# Patient Record
Sex: MALE | Race: WHITE | NOT HISPANIC OR LATINO | Employment: OTHER | URBAN - METROPOLITAN AREA
[De-identification: names, ages, dates, MRNs, and addresses within clinical notes are randomized per-mention and may not be internally consistent; named-entity substitution may affect disease eponyms.]

---

## 2018-05-23 ENCOUNTER — TELEPHONE (OUTPATIENT)
Dept: GASTROENTEROLOGY | Facility: CLINIC | Age: 68
End: 2018-05-23

## 2018-05-23 NOTE — TELEPHONE ENCOUNTER
----- Message from Gisella Arevalo MD sent at 5/23/2018  4:24 PM CDT -----  Regarding: RE: EUS referral  Pancreas cyst clinic    ----- Message -----  From: Julieth Plata MA  Sent: 5/23/2018   1:46 PM  To: Gisella Arevalo MD  Subject: FW: EUS referral                                     ----- Message -----  From: Marielena Nelson  Sent: 5/23/2018   1:39 PM  To: Dionna DYER Staff Adan leandro  Subject: EUS referral                                     Good afternoon,    Patient is being referred for an EUS. Referral and records have been scanned into  for review. Please let me know if you need any additional information.    Thanks!  Marielena

## 2018-08-16 ENCOUNTER — TELEPHONE (OUTPATIENT)
Dept: GASTROENTEROLOGY | Facility: CLINIC | Age: 68
End: 2018-08-16

## 2018-08-16 NOTE — TELEPHONE ENCOUNTER
----- Message from Martinez Renteria sent at 8/16/2018  3:45 PM CDT -----  Contact: Pt  Pt missed a call and would like the nurse to return their call.    Pt can be reached at [phone] after 4pm.    Thanks  
No answer.  
toileting/walking

## 2018-08-16 NOTE — TELEPHONE ENCOUNTER
Attempted to contact patient to schedule appointment. Spoke with Michelle from Hortencia Ferguson's office and informed her that we have been unable to reach patient. She stated that she too has had problems reaching patient. She will attempt to call him and give him our office number.

## 2018-08-21 ENCOUNTER — TELEPHONE (OUTPATIENT)
Dept: GASTROENTEROLOGY | Facility: CLINIC | Age: 68
End: 2018-08-21

## 2018-08-21 NOTE — TELEPHONE ENCOUNTER
----- Message from Shelley Baldwin sent at 8/21/2018  1:07 PM CDT -----  Contact: self - [phone]  deidra- calling for appt for pancreas issues - please call patient at

## 2018-08-21 NOTE — TELEPHONE ENCOUNTER
----- Message from Shelley Baldwin sent at 8/21/2018  1:07 PM CDT -----  Contact: self - [phone]  deidra- calling for appt for pancreas issues - please call patient at

## 2018-08-30 ENCOUNTER — OFFICE VISIT (OUTPATIENT)
Dept: GASTROENTEROLOGY | Facility: CLINIC | Age: 68
End: 2018-08-30
Payer: MEDICARE

## 2018-08-30 VITALS — WEIGHT: 222.69 LBS

## 2018-08-30 DIAGNOSIS — K86.2 PANCREAS CYST: ICD-10-CM

## 2018-08-30 DIAGNOSIS — K86.2 PANCREATIC CYST: ICD-10-CM

## 2018-08-30 PROCEDURE — 99213 OFFICE O/P EST LOW 20 MIN: CPT | Mod: PBBFAC,PO

## 2018-08-30 PROCEDURE — 99999 PR PBB SHADOW E&M-EST. PATIENT-LVL III: CPT | Mod: PBBFAC,,,

## 2018-08-30 PROCEDURE — 99204 OFFICE O/P NEW MOD 45 MIN: CPT | Mod: S$PBB,,, | Performed by: INTERNAL MEDICINE

## 2018-08-30 RX ORDER — ASPIRIN 325 MG
325 TABLET ORAL
Status: ON HOLD | COMMUNITY
Start: 2014-01-09 | End: 2023-07-03 | Stop reason: HOSPADM

## 2018-08-30 RX ORDER — AMLODIPINE BESYLATE 10 MG/1
10 TABLET ORAL
COMMUNITY
Start: 2018-04-09

## 2018-08-30 RX ORDER — ESOMEPRAZOLE MAGNESIUM 40 MG/1
40 CAPSULE, DELAYED RELEASE ORAL
COMMUNITY
Start: 2017-03-23

## 2018-08-30 RX ORDER — ATORVASTATIN CALCIUM 40 MG/1
40 TABLET, FILM COATED ORAL
COMMUNITY
Start: 2018-04-09 | End: 2023-07-03

## 2018-08-30 RX ORDER — TAMSULOSIN HYDROCHLORIDE 0.4 MG/1
0.4 CAPSULE ORAL
COMMUNITY
Start: 2018-08-24 | End: 2023-07-03

## 2018-08-30 RX ORDER — MONTELUKAST SODIUM 10 MG/1
TABLET ORAL
COMMUNITY
Start: 2018-07-23

## 2018-08-30 RX ORDER — FINASTERIDE 5 MG/1
TABLET, FILM COATED ORAL
COMMUNITY
Start: 2018-07-10

## 2018-08-30 RX ORDER — LISINOPRIL 40 MG/1
TABLET ORAL
COMMUNITY
Start: 2018-07-10

## 2019-03-22 NOTE — PROGRESS NOTES
Dr Dutta [place] reports he took care of this  Gastroenterology: [facility]      SUBJECTIVE:         Chief Complaint: Here for evaluation of a pancreatic cyst     History of Present Illness:  Patient is a 67 y.o. male presents with a pancreatic cyst. The cyst was first noted in April of 2018. It's located in the uncinate.  It measures 10 mm  in size.  It is not symptomatic. Previous studies include CT scan.     Previous FNA of the cyst has not been done    There is not a family history of pancreatic cancer     The patient does not smoke.    ECOG status 0 - Asymptomatic      (Not in a hospital admission)    Review of patient's allergies indicates:  No Known Allergies     No past medical history on file.  No past surgical history on file.  No family history on file.  Social History     Tobacco Use    Smoking status: Never Smoker    Smokeless tobacco: Never Used   Substance Use Topics    Alcohol use: No     Frequency: Never    Drug use: Not on file       Review of Systems:  A complete review of systems was performed and other than described in the HPI and below is negative       OBJECTIVE:     Vital Signs (Most Recent)           Diagnostic Results:  Labs: Reviewed  CT: Reviewed     ASSESSMENT/PLAN:     Pancreatic cyst in the uncinate. Measuring 10 mm in size,   Most likely etiology at this point is a undetermined   Year 0  We discussed in detail the natural history of pancreatic cysts, the therapy involved, and the benefits of multimodality imaging including Ct, MRI, and EUS with FNA    Plan:   We will be discussing his case at the pancreactic cyst clinic multidisciplinary conference to finalize a treatment plan.Current ACR guidelines would suggest  MRI in one year    We spent 25 minute in today's clinic with greater than 50% of the time dedicated to counseling and arranging care.

## 2019-12-20 ENCOUNTER — TELEPHONE (OUTPATIENT)
Dept: GASTROENTEROLOGY | Facility: CLINIC | Age: 69
End: 2019-12-20

## 2019-12-20 DIAGNOSIS — K86.2 PANCREATIC CYST: ICD-10-CM

## 2019-12-20 NOTE — TELEPHONE ENCOUNTER
Patient last seen August 2018.   Per clinic note, case was to be discussed at pancreatic multidisciplinary conference. I am not able to see the recommendations for further testing.  Patient is on vacation until January 3 if any testing needs to be done.

## 2019-12-20 NOTE — TELEPHONE ENCOUNTER
----- Message from Laurie Zhong sent at 12/20/2019  8:10 AM CST -----  Contact: [phone]/self  Patient is requesting to speak with you regarding scheduling an appointment. Please advise.

## 2019-12-24 RX ORDER — HYDROCODONE BITARTRATE AND ACETAMINOPHEN 10; 325 MG/1; MG/1
TABLET ORAL
COMMUNITY
Start: 2019-12-17

## 2019-12-24 RX ORDER — CETIRIZINE HYDROCHLORIDE 10 MG/1
10 TABLET ORAL DAILY
COMMUNITY
Start: 2019-10-03

## 2019-12-24 RX ORDER — DICLOFENAC SODIUM 75 MG/1
TABLET, DELAYED RELEASE ORAL
COMMUNITY
Start: 2019-11-26

## 2019-12-24 NOTE — TELEPHONE ENCOUNTER
MRI rescheduled to 01/02/2020 at 1130am.  Confirmed with patient.  Appt reminder mailed to address on file.

## 2020-01-02 ENCOUNTER — HOSPITAL ENCOUNTER (OUTPATIENT)
Dept: RADIOLOGY | Facility: HOSPITAL | Age: 70
Discharge: HOME | End: 2020-01-02
Attending: INTERNAL MEDICINE
Payer: MEDICARE

## 2020-01-02 DIAGNOSIS — K86.2 PANCREATIC CYST: ICD-10-CM

## 2020-01-02 LAB
CREAT SERPL-MCNC: 0.7 MG/DL (ref 0.5–1.4)
SAMPLE: NORMAL

## 2020-01-02 PROCEDURE — 74183 MRI ABD W/O CNTR FLWD CNTR: CPT | Mod: 26,,, | Performed by: RADIOLOGY

## 2020-01-02 PROCEDURE — A9585 GADOBUTROL INJECTION: HCPCS | Performed by: INTERNAL MEDICINE

## 2020-01-02 PROCEDURE — 25500020 PHARM REV CODE 255: Performed by: INTERNAL MEDICINE

## 2020-01-02 PROCEDURE — 74183 MRI ABD W/O CNTR FLWD CNTR: CPT | Mod: TC

## 2020-01-02 PROCEDURE — 74183 MRI ABDOMEN W WO CONTRAST: ICD-10-PCS | Mod: 26,,, | Performed by: RADIOLOGY

## 2020-01-02 RX ORDER — GADOBUTROL 604.72 MG/ML
10 INJECTION INTRAVENOUS
Status: COMPLETED | OUTPATIENT
Start: 2020-01-02 | End: 2020-01-02

## 2020-01-02 RX ADMIN — GADOBUTROL 10 ML: 604.72 INJECTION INTRAVENOUS at 12:01

## 2020-01-03 ENCOUNTER — TELEPHONE (OUTPATIENT)
Dept: ENDOSCOPY | Facility: HOSPITAL | Age: 70
End: 2020-01-03

## 2020-01-03 NOTE — TELEPHONE ENCOUNTER
----- Message from Sanjay Grace MD sent at 1/3/2020  8:41 AM CST -----  Can you let him knw the cyst hasnot change in size.  We should repeat EUS in a year

## 2020-08-30 ENCOUNTER — TELEPHONE (OUTPATIENT)
Dept: ENDOSCOPY | Facility: HOSPITAL | Age: 70
End: 2020-08-30

## 2020-08-30 DIAGNOSIS — K86.2 PANCREATIC CYST: Primary | ICD-10-CM

## 2021-02-08 ENCOUNTER — TELEPHONE (OUTPATIENT)
Dept: GASTROENTEROLOGY | Facility: CLINIC | Age: 71
End: 2021-02-08

## 2021-02-08 DIAGNOSIS — Z01.818 PRE-PROCEDURAL EXAMINATION: ICD-10-CM

## 2021-02-08 RX ORDER — CYCLOBENZAPRINE HCL 5 MG
TABLET ORAL
COMMUNITY
Start: 2021-02-03

## 2021-02-08 RX ORDER — MELOXICAM 7.5 MG/1
TABLET ORAL
COMMUNITY
Start: 2021-02-03

## 2021-02-12 ENCOUNTER — TELEPHONE (OUTPATIENT)
Dept: GASTROENTEROLOGY | Facility: CLINIC | Age: 71
End: 2021-02-12

## 2021-03-11 ENCOUNTER — TELEPHONE (OUTPATIENT)
Dept: GASTROENTEROLOGY | Facility: CLINIC | Age: 71
End: 2021-03-11

## 2021-03-15 ENCOUNTER — TELEPHONE (OUTPATIENT)
Dept: ENDOSCOPY | Facility: HOSPITAL | Age: 71
End: 2021-03-15

## 2021-03-17 ENCOUNTER — ANESTHESIA (OUTPATIENT)
Dept: ENDOSCOPY | Facility: HOSPITAL | Age: 71
End: 2021-03-17
Payer: MEDICARE

## 2021-03-17 ENCOUNTER — HOSPITAL ENCOUNTER (OUTPATIENT)
Facility: HOSPITAL | Age: 71
Discharge: HOME | End: 2021-03-17
Attending: INTERNAL MEDICINE | Admitting: INTERNAL MEDICINE
Payer: MEDICARE

## 2021-03-17 ENCOUNTER — ANESTHESIA EVENT (OUTPATIENT)
Dept: ENDOSCOPY | Facility: HOSPITAL | Age: 71
End: 2021-03-17
Payer: MEDICARE

## 2021-03-17 VITALS
TEMPERATURE: 98 F | RESPIRATION RATE: 16 BRPM | SYSTOLIC BLOOD PRESSURE: 132 MMHG | HEIGHT: 66 IN | WEIGHT: 200 LBS | BODY MASS INDEX: 32.14 KG/M2 | HEART RATE: 66 BPM | DIASTOLIC BLOOD PRESSURE: 65 MMHG | OXYGEN SATURATION: 97 %

## 2021-03-17 DIAGNOSIS — K86.2 PANCREAS CYST: ICD-10-CM

## 2021-03-17 LAB — SARS-COV-2 RDRP RESP QL NAA+PROBE: NEGATIVE

## 2021-03-17 PROCEDURE — 63600175 PHARM REV CODE 636 W HCPCS: Performed by: NURSE ANESTHETIST, CERTIFIED REGISTERED

## 2021-03-17 PROCEDURE — 43259 PR ENDOSCOPIC ULTRASOUND EXAM: ICD-10-PCS | Mod: ,,, | Performed by: INTERNAL MEDICINE

## 2021-03-17 PROCEDURE — U0002 COVID-19 LAB TEST NON-CDC: HCPCS | Performed by: INTERNAL MEDICINE

## 2021-03-17 PROCEDURE — 25000003 PHARM REV CODE 250: Performed by: NURSE ANESTHETIST, CERTIFIED REGISTERED

## 2021-03-17 PROCEDURE — 43259 EGD US EXAM DUODENUM/JEJUNUM: CPT | Performed by: INTERNAL MEDICINE

## 2021-03-17 PROCEDURE — 37000009 HC ANESTHESIA EA ADD 15 MINS: Performed by: INTERNAL MEDICINE

## 2021-03-17 PROCEDURE — 37000008 HC ANESTHESIA 1ST 15 MINUTES: Performed by: INTERNAL MEDICINE

## 2021-03-17 PROCEDURE — 43259 EGD US EXAM DUODENUM/JEJUNUM: CPT | Mod: ,,, | Performed by: INTERNAL MEDICINE

## 2021-03-17 PROCEDURE — 25000003 PHARM REV CODE 250: Performed by: INTERNAL MEDICINE

## 2021-03-17 RX ORDER — SODIUM CHLORIDE 0.9 % (FLUSH) 0.9 %
10 SYRINGE (ML) INJECTION
Status: DISCONTINUED | OUTPATIENT
Start: 2021-03-17 | End: 2021-03-17 | Stop reason: HOSPADM

## 2021-03-17 RX ORDER — SODIUM CHLORIDE 9 MG/ML
INJECTION, SOLUTION INTRAVENOUS CONTINUOUS
Status: DISCONTINUED | OUTPATIENT
Start: 2021-03-17 | End: 2021-03-17 | Stop reason: HOSPADM

## 2021-03-17 RX ORDER — ACETAMINOPHEN 500 MG
1000 TABLET ORAL EVERY 6 HOURS PRN
COMMUNITY

## 2021-03-17 RX ORDER — PROPOFOL 10 MG/ML
VIAL (ML) INTRAVENOUS
Status: DISCONTINUED | OUTPATIENT
Start: 2021-03-17 | End: 2021-03-17

## 2021-03-17 RX ORDER — PROPOFOL 10 MG/ML
VIAL (ML) INTRAVENOUS CONTINUOUS PRN
Status: DISCONTINUED | OUTPATIENT
Start: 2021-03-17 | End: 2021-03-17

## 2021-03-17 RX ORDER — LIDOCAINE HCL/PF 100 MG/5ML
SYRINGE (ML) INTRAVENOUS
Status: DISCONTINUED | OUTPATIENT
Start: 2021-03-17 | End: 2021-03-17

## 2021-03-17 RX ADMIN — PROPOFOL 150 MCG/KG/MIN: 10 INJECTION, EMULSION INTRAVENOUS at 12:03

## 2021-03-17 RX ADMIN — SODIUM CHLORIDE: 0.9 INJECTION, SOLUTION INTRAVENOUS at 11:03

## 2021-03-17 RX ADMIN — PROPOFOL 50 MG: 10 INJECTION, EMULSION INTRAVENOUS at 12:03

## 2021-03-17 RX ADMIN — LIDOCAINE HYDROCHLORIDE 50 MG: 20 INJECTION, SOLUTION INTRAVENOUS at 12:03

## 2021-03-22 ENCOUNTER — TELEPHONE (OUTPATIENT)
Dept: GASTROENTEROLOGY | Facility: CLINIC | Age: 71
End: 2021-03-22

## 2022-01-03 ENCOUNTER — TELEPHONE (OUTPATIENT)
Dept: ENDOSCOPY | Facility: HOSPITAL | Age: 72
End: 2022-01-03
Payer: MEDICARE

## 2022-01-03 DIAGNOSIS — R93.89 ABNORMAL FINDING ON IMAGING: Primary | ICD-10-CM

## 2022-01-03 NOTE — TELEPHONE ENCOUNTER
----- Message from Julieth Plata MA sent at 10/19/2021  8:43 PM CDT -----         - Perform magnetic resonance imaging (MRI) with                         gadolinium in 1 year.   Sanjay Grace MD   3/17/2021 12:37:39 PM

## 2022-03-18 ENCOUNTER — HOSPITAL ENCOUNTER (OUTPATIENT)
Dept: RADIOLOGY | Facility: HOSPITAL | Age: 72
Discharge: HOME | End: 2022-03-18
Attending: INTERNAL MEDICINE
Payer: MEDICARE

## 2022-03-18 DIAGNOSIS — R93.89 ABNORMAL FINDING ON IMAGING: ICD-10-CM

## 2022-03-18 PROCEDURE — 76376 3D RENDER W/INTRP POSTPROCES: CPT | Mod: 26,,, | Performed by: RADIOLOGY

## 2022-03-18 PROCEDURE — 76376 MRI ABDOMEN WITHOUT CONTRAST MRCP: ICD-10-PCS | Mod: 26,,, | Performed by: RADIOLOGY

## 2022-03-18 PROCEDURE — 74181 MRI ABDOMEN W/O CONTRAST: CPT | Mod: TC

## 2022-03-18 PROCEDURE — 74181 MRI ABDOMEN W/O CONTRAST: CPT | Mod: 26,,, | Performed by: RADIOLOGY

## 2022-03-18 PROCEDURE — 76376 3D RENDER W/INTRP POSTPROCES: CPT | Mod: TC

## 2022-03-18 PROCEDURE — 74181 MRI ABDOMEN WITHOUT CONTRAST MRCP: ICD-10-PCS | Mod: 26,,, | Performed by: RADIOLOGY

## 2022-03-23 ENCOUNTER — TELEPHONE (OUTPATIENT)
Dept: ENDOSCOPY | Facility: HOSPITAL | Age: 72
End: 2022-03-23
Payer: MEDICARE

## 2022-03-23 NOTE — TELEPHONE ENCOUNTER
Denise,    Please see below from Dr. Grace.            MD Little Ruiz, RN  Can you let him the cyst hasn't changed in a year.  We should do an EUS in one year.     After 5 years of stability we'll stretch the surveillance out to every 2 years.                 MRI MRCP Without Contrast  Order: [id number]   Status: Final result     Visible to patient: No (inaccessible in Patient Portal)     Dx: Abnormal finding on imaging     1 Result Note    Details    Reading Physician Reading Date Result Priority   Aguilar Cano MD  [phone]  [phone] 3/18/2022 Routine     Narrative & Impression  EXAMINATION:  MRI ABDOMEN WITHOUT CONTRAST MRCP     CLINICAL HISTORY:  pancreas cyst;  Abnormal findings on diagnostic imaging of other specified body structures     TECHNIQUE:  Multiplanar, multisequence images are performed through the liver and upper abdomen.  Additionally 2D and 3D MRCP sequences are performed as well as MIP images.     COMPARISON:  01/02/2020     FINDINGS:  There is a 1.1 cm cystic lesion along the uncinate process of the pancreas.  There is no discrete solid nodule or septation.  There is no clear involvement with the adjacent pancreas duct which is normal in caliber.  Unremarkable gallbladder.  No biliary dilation.  No filling defect in the common bile duct which is normal in caliber.  There are multiple T2 hyperintense lesions in each renal cortex incompletely characterized without contrast.  There is a nodule along the medial limb left adrenal gland measuring 1.1 cm with signal dropout on out of phase sequence.  Remaining solid abdominal organs are unremarkable.  Proximal gastric wall thickening is presumably from inadequate distension.  No dilated bowel loops.  No ascites or abdominal adenopathy.     Impression:     Minimal if any change in size of the pancreas cystic lesion.  Additional cystic lesions along the kidneys are unchanged.  Left adrenal adenoma.        Electronically signed  by: Aguilar Cano  Date:                                            03/18/2022  Time:                                           16:10             Exam Ended: 03/18/22 12:07 Last Resulted: 03/18/22 16:10        Order Details      View Encounter      Lab and Collection Details      Routing      Result History             Result Care Coordination        Result Notes     Sanjay Grace MD   3/22/2022 10:10 AM CDT Back to Top        Can you let him the cyst hasn't changed in a year.  We should do an EUS in one year.     After 5 years of stability we'll stretch the surveillance out to every 2 years.

## 2022-03-25 ENCOUNTER — TELEPHONE (OUTPATIENT)
Dept: ENDOSCOPY | Facility: HOSPITAL | Age: 72
End: 2022-03-25
Payer: MEDICARE

## 2022-03-25 NOTE — TELEPHONE ENCOUNTER
Sanjay Grace MD   3/22/2022 10:10 AM CDT         Can you let him the cyst hasn't changed in a year.  We should do an EUS in one year.     After 5 years of stability we'll stretch the surveillance out to every 2 years.     Patient informed.

## 2022-03-25 NOTE — TELEPHONE ENCOUNTER
----- Message from Maya Enamorado sent at 3/25/2022  1:00 PM CDT -----  Contact: Pt mobile [phone]  Calling to get test results.  Name of test (lab, x-ray): MRI  Date of test: 03/18/2022  Where was the test performed: @[facility] [place] location  Would you like a call back, or a response through your [facility] portal?: Both

## 2022-03-25 NOTE — TELEPHONE ENCOUNTER
----- Message from Andrade Rodriguez sent at 3/25/2022  2:30 PM CDT -----  Contact: [phone]  Pt returning missed call from Julieth. Pt would like a call back.    [phone]

## 2023-01-03 DIAGNOSIS — K86.2 PANCREATIC CYST: Primary | ICD-10-CM

## 2023-03-19 ENCOUNTER — TELEPHONE (OUTPATIENT)
Dept: ENDOSCOPY | Facility: HOSPITAL | Age: 73
End: 2023-03-19

## 2023-04-26 ENCOUNTER — TELEPHONE (OUTPATIENT)
Dept: ENDOSCOPY | Facility: HOSPITAL | Age: 73
End: 2023-04-26
Payer: MEDICARE

## 2023-04-26 NOTE — TELEPHONE ENCOUNTER
Telephoned pt and pt's wife, Kelley, to schedule EUS, both with no answer.  Left voicemail messages with direct contact number for pt to return call.

## 2023-06-16 ENCOUNTER — TELEPHONE (OUTPATIENT)
Dept: ENDOSCOPY | Facility: HOSPITAL | Age: 73
End: 2023-06-16
Payer: MEDICARE

## 2023-06-16 NOTE — TELEPHONE ENCOUNTER
Pt telephoned to schedule EUS.  Spoke with pt and procedure scheduled for 7/3/23 at 10:00am at [facility].  Reviewed history and medications.  Instructed on procedure and preparation.  Pt verbalized understanding.  Instructions mailed to address on file.

## 2023-06-29 ENCOUNTER — TELEPHONE (OUTPATIENT)
Dept: ENDOSCOPY | Facility: HOSPITAL | Age: 73
End: 2023-06-29
Payer: MEDICARE

## 2023-06-29 NOTE — TELEPHONE ENCOUNTER
Spoke with patient about arrival time @ 0900.   [facility]    Day Before Procedure 7/2/23      You may have a light evening meal.   No solid food after 7:00 pm.   Continue drinking clear liquids.      Day of the Procedure 7/3/23      You may have water/clear liquids until 4 hours before your procedure or as directed by the scheduling nurse 6:00 AM.   See below for list.     What You CANNOT do:   Do not drink milk or anything colored red.  Do not drink alcohol.  No gum chewing or candy morning of procedure     Liquids That Are OK to Drink:   Water  Sports drinks (Gatorade, Power-Aid)  Coffee or tea (no cream or nondairy creamer)  Clear juices without pulp (apple, white grape)  Gelatin desserts (no fruit or toppings)  Clear soda (sprite, coke, ginger ale)  Chicken broth (until 12 midnight the night before procedure)      Medications: Do not take Insulin or oral diabetic medications the day of the procedure.    Take as prescribed: heart, seizure and blood pressure medication in the morning with a sip of water (less than an ounce).  Take any breathing medications and bring inhalers to hospital with you.     Leave all valuables and jewelry at home. Wear comfortable clothes to procedure to change into hospital gown.   You cannot drive for 24 hours after your procedure because you will receive sedation for your procedure to make you comfortable.    A ride must be provided at discharge.

## 2023-07-03 ENCOUNTER — HOSPITAL ENCOUNTER (OUTPATIENT)
Facility: HOSPITAL | Age: 73
Discharge: HOME | End: 2023-07-03
Attending: INTERNAL MEDICINE | Admitting: INTERNAL MEDICINE
Payer: MEDICARE

## 2023-07-03 ENCOUNTER — ANESTHESIA (OUTPATIENT)
Dept: ENDOSCOPY | Facility: HOSPITAL | Age: 73
End: 2023-07-03
Payer: MEDICARE

## 2023-07-03 ENCOUNTER — ANESTHESIA EVENT (OUTPATIENT)
Dept: ENDOSCOPY | Facility: HOSPITAL | Age: 73
End: 2023-07-03
Payer: MEDICARE

## 2023-07-03 VITALS
OXYGEN SATURATION: 98 % | BODY MASS INDEX: 35.36 KG/M2 | HEART RATE: 62 BPM | TEMPERATURE: 98 F | WEIGHT: 220 LBS | RESPIRATION RATE: 20 BRPM | HEIGHT: 66 IN | SYSTOLIC BLOOD PRESSURE: 170 MMHG | DIASTOLIC BLOOD PRESSURE: 77 MMHG

## 2023-07-03 DIAGNOSIS — K86.2 PANCREATIC CYST: ICD-10-CM

## 2023-07-03 PROCEDURE — 43259 EGD US EXAM DUODENUM/JEJUNUM: CPT | Performed by: INTERNAL MEDICINE

## 2023-07-03 PROCEDURE — 43259 EGD US EXAM DUODENUM/JEJUNUM: CPT | Mod: ,,, | Performed by: INTERNAL MEDICINE

## 2023-07-03 PROCEDURE — 25000003 PHARM REV CODE 250: Performed by: INTERNAL MEDICINE

## 2023-07-03 PROCEDURE — 25000003 PHARM REV CODE 250: Performed by: NURSE ANESTHETIST, CERTIFIED REGISTERED

## 2023-07-03 PROCEDURE — D9220A PRA ANESTHESIA: Mod: ANES,,, | Performed by: ANESTHESIOLOGY

## 2023-07-03 PROCEDURE — 63600175 PHARM REV CODE 636 W HCPCS: Performed by: NURSE ANESTHETIST, CERTIFIED REGISTERED

## 2023-07-03 PROCEDURE — D9220A PRA ANESTHESIA: Mod: CRNA,,, | Performed by: NURSE ANESTHETIST, CERTIFIED REGISTERED

## 2023-07-03 PROCEDURE — 43259 PR ENDOSCOPIC ULTRASOUND EXAM: ICD-10-PCS | Mod: ,,, | Performed by: INTERNAL MEDICINE

## 2023-07-03 PROCEDURE — 37000008 HC ANESTHESIA 1ST 15 MINUTES: Performed by: INTERNAL MEDICINE

## 2023-07-03 PROCEDURE — D9220A PRA ANESTHESIA: ICD-10-PCS | Mod: ANES,,, | Performed by: ANESTHESIOLOGY

## 2023-07-03 PROCEDURE — 37000009 HC ANESTHESIA EA ADD 15 MINS: Performed by: INTERNAL MEDICINE

## 2023-07-03 PROCEDURE — D9220A PRA ANESTHESIA: ICD-10-PCS | Mod: CRNA,,, | Performed by: NURSE ANESTHETIST, CERTIFIED REGISTERED

## 2023-07-03 RX ORDER — PROPOFOL 10 MG/ML
VIAL (ML) INTRAVENOUS
Status: DISCONTINUED | OUTPATIENT
Start: 2023-07-03 | End: 2023-07-03

## 2023-07-03 RX ORDER — SODIUM CHLORIDE 9 MG/ML
INJECTION, SOLUTION INTRAVENOUS CONTINUOUS
Status: DISCONTINUED | OUTPATIENT
Start: 2023-07-03 | End: 2023-07-03 | Stop reason: HOSPADM

## 2023-07-03 RX ORDER — SODIUM CHLORIDE 0.9 % (FLUSH) 0.9 %
3 SYRINGE (ML) INJECTION
Status: DISCONTINUED | OUTPATIENT
Start: 2023-07-03 | End: 2023-07-03 | Stop reason: HOSPADM

## 2023-07-03 RX ORDER — LIDOCAINE HYDROCHLORIDE 20 MG/ML
INJECTION, SOLUTION EPIDURAL; INFILTRATION; INTRACAUDAL; PERINEURAL
Status: DISCONTINUED | OUTPATIENT
Start: 2023-07-03 | End: 2023-07-03

## 2023-07-03 RX ORDER — ASPIRIN 81 MG/1
81 TABLET ORAL DAILY
COMMUNITY

## 2023-07-03 RX ADMIN — SODIUM CHLORIDE: 0.9 INJECTION, SOLUTION INTRAVENOUS at 10:07

## 2023-07-03 RX ADMIN — PROPOFOL 210 MG: 10 INJECTION, EMULSION INTRAVENOUS at 10:07

## 2023-07-03 RX ADMIN — SODIUM CHLORIDE: 9 INJECTION, SOLUTION INTRAVENOUS at 10:07

## 2023-07-03 RX ADMIN — LIDOCAINE HYDROCHLORIDE 100 MG: 20 INJECTION, SOLUTION EPIDURAL; INFILTRATION; INTRACAUDAL; PERINEURAL at 10:07

## 2023-07-03 NOTE — PROVATION PATIENT INSTRUCTIONS
Discharge Summary/Instructions after an Endoscopic Procedure  Patient Name: Mauro Soto  Patient MRN: [id number]  Patient YOB: 1950  Monday, July 3, 2023  Sanjay Grace MD  Dear patient,  As a result of recent federal legislation (The Federal Cures Act), you may   receive lab or pathology results from your procedure in your [facility]   account before your physician is able to contact you. Your physician or   their representative will relay the results to you with their   recommendations at their soonest availability.  Thank you,  Your health is very important to us during the Covid Crisis. Following your   procedure today, you will receive a daily text for 2 weeks asking about   signs or symptoms of Covid 19.  Please respond to this text when you   receive it so we can follow up and keep you as safe as possible.   RESTRICTIONS:  During your procedure today, you received medications for sedation.  These   medications may affect your judgment, balance and coordination.  Therefore,   for 24 hours, you have the following restrictions:   - DO NOT drive a car, operate machinery, make legal/financial decisions,   sign important papers or drink alcohol.    ACTIVITY:  Today: no heavy lifting, straining or running due to procedural   sedation/anesthesia.  The following day: return to full activity including work.  DIET:  Eat and drink normally unless instructed otherwise.     TREATMENT FOR COMMON SIDE EFFECTS:  - Mild abdominal pain, nausea, belching, bloating or excessive gas:  rest,   eat lightly and use a heating pad.  - Sore Throat: treat with throat lozenges and/or gargle with warm salt   water.  - Because air was used during the procedure, expelling large amounts of air   from your rectum or belching is normal.  - If a bowel prep was taken, you may not have a bowel movement for 1-3 days.    This is normal.  SYMPTOMS TO WATCH FOR AND REPORT TO YOUR PHYSICIAN:  1. Abdominal pain or bloating, other than gas  cramps.  2. Chest pain.  3. Back pain.  4. Signs of infection such as: chills or fever occurring within 24 hours   after the procedure.  5. Rectal bleeding, which would show as bright red, maroon, or black stools.   (A tablespoon of blood from the rectum is not serious, especially if   hemorrhoids are present.)  6. Vomiting.  7. Weakness or dizziness.  GO DIRECTLY TO THE NEAREST EMERGENCY ROOM IF YOU HAVE ANY OF THE FOLLOWING:      Difficulty breathing              Chills and/or fever over 101 F   Persistent vomiting and/or vomiting blood   Severe abdominal pain   Severe chest pain   Black, tarry stools   Bleeding- more than one tablespoon   Any other symptom or condition that you feel may need urgent attention  Your doctor recommends these additional instructions:  If any biopsies were taken, your doctors clinic will contact you in 1 to 2   weeks with any results.  - Discharge patient to home.   - Resume previous diet.   - Continue present medications.   - Return to pancreas clinic in 1 year. Inrease surveillance inerval to 2   years  For questions, problems or results please call your physician - Sanjay Grace MD.  EMERGENCY PHONE NUMBER: [phone],  LAB RESULTS: [phone]  IF A COMPLICATION OR EMERGENCY SITUATION ARISES AND YOU ARE UNABLE TO REACH   YOUR PHYSICIAN - GO DIRECTLY TO THE EMERGENCY ROOM.  Sanjay Grace MD  7/3/2023 10:37:45 AM  This report has been verified and signed electronically.  Dear patient,  As a result of recent federal legislation (The Federal Cures Act), you may   receive lab or pathology results from your procedure in your [facility]   account before your physician is able to contact you. Your physician or   their representative will relay the results to you with their   recommendations at their soonest availability.  Thank you,  PROVATION

## 2023-07-03 NOTE — TRANSFER OF CARE
"Anesthesia Transfer of Care Note    Patient: Mauro Soto    Procedure(s) Performed: Procedure(s) (LRB):  ULTRASOUND, UPPER GI TRACT, ENDOSCOPIC (N/A)    Patient location: GI    Anesthesia Type: general    Transport from OR: Transported from OR on room air with adequate spontaneous ventilation    Post pain: adequate analgesia    Post assessment: no apparent anesthetic complications    Post vital signs: stable    Level of consciousness: awake and alert    Nausea/Vomiting: no nausea/vomiting    Complications: none    Transfer of care protocol was followed      Last vitals:   Visit Vitals  BP (!) 188/84 (BP Location: Left arm, Patient Position: Lying)   Pulse 64   Temp 36.6 °C (97.9 °F) (Temporal)   Resp 18   Ht 5' 6" (1.676 m)   Wt 99.8 kg (220 lb)   SpO2 98%   BMI 35.51 kg/m²     "

## 2023-07-03 NOTE — ANESTHESIA PREPROCEDURE EVALUATION
07/03/2023  Mauro Soto is a 72 y.o., male here for EUS    Past Medical History:   Diagnosis Date    Pancreas cyst 8/30/2018     Past Surgical History:   Procedure Laterality Date    APPENDECTOMY      COLONOSCOPY      ENDOSCOPIC ULTRASOUND OF UPPER GASTROINTESTINAL TRACT N/A 3/17/2021    Procedure: ULTRASOUND, UPPER GI TRACT, ENDOSCOPIC;  Surgeon: Sanjay Grace MD;  Location: [facility];  Service: Endoscopy;  Laterality: N/A;  pt requested to have a rapid covid test done the morning of procedure bc he lives in [place], [place]    ESOPHAGOGASTRODUODENOSCOPY      heart stent      2008    HERNIA REPAIR      umbilical    KNEE ARTHROSCOPY Right     TOTAL SHOULDER ARTHROPLASTY Right     02/02/2021           Pre-op Assessment    I have reviewed the Patient Summary Reports.     I have reviewed the Nursing Notes. I have reviewed the NPO Status.      Review of Systems  Anesthesia Hx:  History of prior surgery of interest to airway management or planning:  Denies Personal Hx of Anesthesia complications.   Cardiovascular:   Exercise tolerance: good    Pulmonary:  Pulmonary Normal    Endocrine:  Endocrine Normal        Physical Exam  General: Well nourished    Airway:  Mallampati: II   Mouth Opening: Normal  Neck ROM: Normal ROM    Dental:  Intact        Anesthesia Plan  Type of Anesthesia, risks & benefits discussed:    Anesthesia Type: Gen Natural Airway  Informed Consent: Informed consent signed with the Patient and all parties understand the risks and agree with anesthesia plan.  All questions answered.   ASA Score: 2    Ready For Surgery From Anesthesia Perspective.     .

## 2023-07-03 NOTE — ANESTHESIA POSTPROCEDURE EVALUATION
Anesthesia Post Evaluation    Patient: Mauro Soto    Procedure(s) Performed: Procedure(s) (LRB):  ULTRASOUND, UPPER GI TRACT, ENDOSCOPIC (N/A)    Final Anesthesia Type: general      Patient location during evaluation: PACU  Patient participation: Yes- Able to Participate  Level of consciousness: awake and alert  Post-procedure vital signs: reviewed and stable  Pain management: adequate  Airway patency: patent    PONV status at discharge: No PONV  Anesthetic complications: no      Cardiovascular status: blood pressure returned to baseline  Respiratory status: unassisted  Hydration status: euvolemic            Vitals Value Taken Time   /77 07/03/23 1111   Temp 36.8 °C (98.2 °F) 07/03/23 1041   Pulse 62 07/03/23 1111   Resp 20 07/03/23 1111   SpO2 98 % 07/03/23 1111         Event Time   Out of Recovery 11:29:00         Pain/Eliceo Score: Eliceo Score: 10 (7/3/2023 11:11 AM)

## 2023-07-03 NOTE — H&P
Short Stay Endoscopy History and Physical    PCP - Eli Stewart NP  Referring Physician - Sanjay Grace MD  [address]  [address]  [place] [address]    Procedure - eus  ASA - per anesthesia  Mallampati - per anesthesia  History of Anesthesia problems - no  Family history Anesthesia problems -  no   Plan of anesthesia - General    HPI:  This is a 72 y.o. male here for evaluation of: pancreas cyst      Reflux - no  Dysphagia - no  Abdominal pain - no  Diarrhea - no    ROS:  Constitutional: No fevers, chills, No weight loss  CV: No chest pain  Pulm: No cough, No shortness of breath  Ophtho: No vision changes  GI: see HPI  Derm: No rash    Medical History:  has a past medical history of Pancreas cyst (8/30/2018).    Surgical History:  has a past surgical history that includes Total shoulder arthroplasty (Right); Appendectomy; Colonoscopy; Esophagogastroduodenoscopy; Hernia repair; Knee arthroscopy (Right); heart stent; and Endoscopic ultrasound of upper gastrointestinal tract (N/A, 3/17/2021).    Family History: family history is not on file..    Social History:  reports that he has never smoked. He has never used smokeless tobacco. He reports that he does not drink alcohol and does not use drugs.    Review of patient's allergies indicates:  No Known Allergies    Medications:   Medications Prior to Admission   Medication Sig Dispense Refill Last Dose    acetaminophen (TYLENOL) 500 MG tablet Take 1,000 mg by mouth every 6 (six) hours as needed for Pain.   7/2/2023    amLODIPine (NORVASC) 10 MG tablet Take 10 mg by mouth.   7/2/2023    aspirin (ECOTRIN) 81 MG EC tablet Take 81 mg by mouth once daily.   7/2/2023    atorvastatin (LIPITOR) 40 MG tablet Take 40 mg by mouth.   7/2/2023    cetirizine (ZYRTEC) 10 MG tablet Take 10 mg by mouth once daily.   7/2/2023    esomeprazole (NEXIUM) 40 MG capsule Take 40 mg by mouth.   7/2/2023    finasteride (PROSCAR) 5 mg tablet    7/2/2023    lisinopril  (PRINIVIL,ZESTRIL) 40 MG tablet    7/2/2023    montelukast (SINGULAIR) 10 mg tablet    7/2/2023    multivitamin with minerals tablet Take 1 tablet by mouth.   7/2/2023    tamsulosin (FLOMAX) 0.4 mg Cap Take 0.4 mg by mouth.   7/2/2023    aspirin 325 MG tablet Take 325 mg by mouth.       cyclobenzaprine (FLEXERIL) 5 MG tablet        diclofenac (VOLTAREN) 75 MG EC tablet        HYDROcodone-acetaminophen (NORCO)  mg per tablet        meloxicam (MOBIC) 7.5 MG tablet           Physical Exam:    Vital Signs: There were no vitals filed for this visit.    General Appearance: Well appearing in no acute distress    Labs:  Lab Results   Component Value Date    CHOL 177 02/06/2023    TRIG 90 02/06/2023    HDL 48 02/06/2023     02/06/2023    K 4.5 02/06/2023     02/06/2023    CREATININE 0.76 02/06/2023    BUN 16 02/06/2023    CO2 30 02/06/2023       I have explained the risks and benefits of this endoscopic procedure to the patient including but not limited to bleeding, inflammation, infection, perforation, and death.      Sanjay Grace MD

## 2024-06-25 ENCOUNTER — TELEPHONE (OUTPATIENT)
Dept: GASTROENTEROLOGY | Facility: CLINIC | Age: 74
End: 2024-06-25
Payer: MEDICARE

## 2024-06-25 NOTE — TELEPHONE ENCOUNTER
----- Message from Laura Carlos RN sent at 6/25/2024  9:50 AM CDT -----  AES clinic  ----- Message -----  From: Julieth Plata MA  Sent: 5/1/2024  12:00 AM CDT  To: [facility] Advanced Endoscopy Recall              - Return to pancreas clinic in 1 year. Inrease                          surveillance inerval to 2 years   Attending Participation:        I personally performed the entire procedure.   Sanjay Grace MD   7/3/2023 10:37:45 AM

## 2024-09-09 ENCOUNTER — TELEPHONE (OUTPATIENT)
Dept: GASTROENTEROLOGY | Facility: CLINIC | Age: 74
End: 2024-09-09
Payer: MEDICARE

## 2024-09-09 NOTE — TELEPHONE ENCOUNTER
----- Message from Js Dillard sent at 9/9/2024  2:52 PM CDT -----  .Type:  Needs Medical Advice    Who Called: pt    Would the patient rather a call back or a response via [facility]? Call back  Best Call Back Number: [phone]  Additional Information:     Pt stated he would like a call back to reschedule his appt for 9/11